# Patient Record
Sex: FEMALE | Race: WHITE | NOT HISPANIC OR LATINO
[De-identification: names, ages, dates, MRNs, and addresses within clinical notes are randomized per-mention and may not be internally consistent; named-entity substitution may affect disease eponyms.]

---

## 2017-08-01 ENCOUNTER — RESULT REVIEW (OUTPATIENT)
Age: 52
End: 2017-08-01

## 2024-03-16 ENCOUNTER — EMERGENCY (EMERGENCY)
Facility: HOSPITAL | Age: 59
LOS: 1 days | Discharge: HOME | End: 2024-03-16
Attending: EMERGENCY MEDICINE
Payer: COMMERCIAL

## 2024-03-16 VITALS
OXYGEN SATURATION: 96 % | RESPIRATION RATE: 18 BRPM | DIASTOLIC BLOOD PRESSURE: 98 MMHG | TEMPERATURE: 98 F | HEART RATE: 88 BPM | SYSTOLIC BLOOD PRESSURE: 142 MMHG

## 2024-03-16 VITALS
HEIGHT: 63 IN | RESPIRATION RATE: 18 BRPM | SYSTOLIC BLOOD PRESSURE: 154 MMHG | WEIGHT: 195.99 LBS | HEART RATE: 92 BPM | OXYGEN SATURATION: 100 % | DIASTOLIC BLOOD PRESSURE: 94 MMHG | TEMPERATURE: 98 F

## 2024-03-16 PROCEDURE — 99284 EMERGENCY DEPT VISIT MOD MDM: CPT

## 2024-03-16 RX ORDER — TRAMADOL HYDROCHLORIDE 50 MG/1
50 TABLET ORAL ONCE
Refills: 0 | Status: DISCONTINUED | OUTPATIENT
Start: 2024-03-16 | End: 2024-03-16

## 2024-03-16 RX ORDER — TRAMADOL HYDROCHLORIDE 50 MG/1
1 TABLET ORAL
Qty: 1 | Refills: 0
Start: 2024-03-16 | End: 2024-03-16

## 2024-03-16 RX ADMIN — TRAMADOL HYDROCHLORIDE 50 MILLIGRAM(S): 50 TABLET ORAL at 13:53

## 2024-03-16 NOTE — ED PROVIDER NOTE - OBJECTIVE STATEMENT
58-year-old female past medical history hypertension, hyperlipidemia, osteoporosis, fibromyalgia on tramadol 50 mg for many years, presents to ED referred by [facility] for evaluation for elevated blood pressure in setting of patient missing her tramadol dosing for the last 3 days.  Patient has had lapse in prescription and is anticipating Rx delivery within the next 2 days.  Patient has never gone 3 days without taking her tramadol.  Is prescribed 50 mg 3 times a day but typically only takes this once a day.  Presently patient is without any complaints.  She has no headache, dizziness, vision changes, chest pain, shortness of breath, abdominal pain, nausea, vomiting, numbness or weakness, fevers or chills.  Patient adamantly denies feeling any jitteriness. No hx of stroke or CAD.

## 2024-03-16 NOTE — ED PROVIDER NOTE - PATIENT PORTAL LINK FT
You can access the FollowMyHealth Patient Portal offered by [facility] by registering at the following website: [email or web address]. By joining [employer]’s FollowMyHealth portal, you will also be able to view your health information using other applications (apps) compatible with our system.

## 2024-03-16 NOTE — ED PROVIDER NOTE - PHYSICAL EXAMINATION
Vitals reviewed  GEN: Pt well appearing in NAD, alert.  PSYCH: Affect and mood appropriate.  EYES: Sclera white w/o injection, EOMI, PERRLA.   ENT: MM dry. Neck supple. Airway patent.  RESP: CTA b/l.  CARDIAC: RRR, S1, S2, no M/G/R.  ABD: Soft, NT. No CVAT b/l.  MSK: SIERRA spontaneously.  NEURO: No tongue fasciculations or tremors. CN 2-12 grossly intact. No focal motor or sensory deficits. Normal gait. No drift. Rapid and point to point movement intact. Strength 5/5 throughout.  VASC: Strong distal pulses. No edema.  SKIN: No rashes or lesions.

## 2024-03-16 NOTE — ED ADULT NURSE NOTE - ED CARDIAC RHYTHM
Ok to schedule colonoscopy for hx of polyps.  Sutabs    Electronically signed by: Gretchen Juárez MD  3/24/2021   regular

## 2024-03-16 NOTE — ED PROVIDER NOTE - CLINICAL SUMMARY MEDICAL DECISION MAKING FREE TEXT BOX
58-year-old morbidly obese female has a long history of fibromyalgia rheumatica on tramadol, hypertension hyperlipidemia presented to emergency room because she ran out off tramadol and delivery from [place] prescription is only 2 days, she denies any acute changes in her condition just afraid to be off tramadol more than 3 days    Will renew her medications and follow-up private medical doctor [facility]

## 2024-03-16 NOTE — ED PROVIDER NOTE - NSFOLLOWUPINSTRUCTIONS_ED_ALL_ED_FT
Follow-up with your primary care provider within 1 week.    Continue to take all medications as prescribed.    Rest and drink plenty of water.  When checking her blood pressure do so either first thing in the morning or first thing before bed at time when you are most relaxed.    Return to ER with any new, worsening or concerning symptoms.

## 2024-03-16 NOTE — ED ADULT NURSE NOTE - OBJECTIVE STATEMENT
patient received alert & oriented x4, ambulatory, accompanied by family. Complained of high BP, lightheaded, discomfort to forehead & dizzy x 3 days "feeling jittery with Bp. Denies fever, chestpain, nausea, vomiting or unsteadiness. Went to urgent care today & sent here. Currently been taking meds for fibromyalgia. No neuro deficits. GCS-15.

## 2025-06-09 NOTE — ED ADULT NURSE NOTE - CHPI ED NUR SEVERITY2
In an effort to ensure that our patients [employer], a Team Member has reviewed your chart and identified an opportunity to provide the best care possible. An attempt was made to discuss or schedule due or overdue Preventive or Chronic Condition care.Care Gaps identified:   Health Maintenance Due   Topic Date Due    DTaP/Tdap/Td Vaccine (1 - Tdap) Never done    Breast Cancer Screening  Never done    Shingles Vaccine (1 of 2) Never done    Pneumococcal Vaccine 50+ (1 of 1 - PCV) Never done    Osteoporosis Screening  Never done    COVID-19 Vaccine (1 - 2024-25 season) Never done    Respiratory Syncytial Virus (RSV) Vaccine 60+ (1 - 1-dose 75+ series) 07/21/2025    Traditional Medicare- Medicare Wellness Visit  12/01/2025         The Outcome was letter mailed.       
PAIN SCALE 3 OF 10.